# Patient Record
(demographics unavailable — no encounter records)

---

## 2024-10-10 NOTE — PHYSICAL EXAM
[Normal Conjunctiva] : normal conjunctiva [Normal Venous Pressure] : normal venous pressure [No Carotid Bruit] : no carotid bruit [Normal S1, S2] : normal S1, S2 [No Murmur] : no murmur [Soft] : abdomen soft [Normal Gait] : normal gait [Normal] : alert and oriented, normal memory

## 2024-10-10 NOTE — ASSESSMENT
[FreeTextEntry1] : pt with cad asymptomatic ldl 87 patient's rosuvastatin dose was increased from 10 mg to 20 mg with a goal of an LDL below 55 pt with knee injury pt has compartment syndrome in ankle pt on antiinflamatory and pt stress fx in knee pt had a fasciotomy  mr asymptomatic  will order a cta as patient is unable to exercise vigorously

## 2024-10-10 NOTE — REASON FOR VISIT
[FreeTextEntry1] : pt calcium score 154 hm colonscopy 2023  pt had a knee injury tchol 167 hdl 52 tg 146 ldl88 2024 pt had compartment syndrome

## 2024-11-20 NOTE — HISTORY OF PRESENT ILLNESS
[FreeTextEntry1] : 56M with HLD presenting for evaluation of positive CCTA. He states that he feels well and has no complaints. He denied cp, SOB, palpitations.  Unknown FHx as was adopted Never smoker   TTE(2023): nl EF, no valvular disease CCTA (11/5/24): , moderate pLAD, mLAD, D1. CT-FFR(11/2024): LAD 0.87 prox, 0.83 mid, 0.79 distal. D1 0.74. LCx 0.85. RCA 0.88.  long discussion on med therapy vs cath/PCI,

## 2024-11-20 NOTE — DISCUSSION/SUMMARY
[FreeTextEntry1] : 1.Cad-moderate pretest prob, positive ct/ffr, no chest pain , long discussion on medical therapy ( diet, exercise, medication counseling), pt to consider options  asa  2.chol- Crestor 20, continue, discussed Colchicine, will consider

## 2025-01-30 NOTE — DISCUSSION/SUMMARY
[FreeTextEntry1] : 1.Cad-moderate pretest prob, positive ct/ffr, no chest pain , long discussion on medical therapy ( diet, exercise, medication counseling), s/p stent to lad  asa plavix 75mg po qd Rosuvastatin 20mg po qd pt ldl 48

## 2025-01-30 NOTE — HISTORY OF PRESENT ILLNESS
[FreeTextEntry1] : 56M with HLD presenting for evaluation of positive CCTA. He states that he feels well and has no complaints. He denied cp, SOB, palpitations.pt s/p stent to lad   Unknown FHx as was adopted Never smoker   TTE(2023): nl EF, no valvular disease CCTA (11/5/24): , moderate pLAD, mLAD, D1. CT-FFR(11/2024): LAD 0.87 prox, 0.83 mid, 0.79 distal. D1 0.74. LCx 0.85. RCA 0.88.  long discussion on med therapy vs cath/PCI,

## 2025-02-26 NOTE — DISCUSSION/SUMMARY
[FreeTextEntry1] : 1.Cad-, s/p stent to lad  asa plavix 75mg po qd Rosuvastatin 20mg po qd pt ldl 48 pt had a negative ett with no EDUAR max hr 150 METS 12

## 2025-02-26 NOTE — HISTORY OF PRESENT ILLNESS
[FreeTextEntry1] : 56M with HLD presenting for evaluation of positive CCTA. He states that he feels well and has no complaints. He denied cp, SOB, palpitations.pt s/p stent to lad   Unknown FHx as was adopted Never smoker   TTE(2023): nl EF, no valvular disease CCTA (11/5/24): , moderate pLAD, mLAD, D1. CT-FFR(11/2024): LAD 0.87 prox, 0.83 mid, 0.79 distal. D1 0.74. LCx 0.85. RCA 0.88.

## 2025-03-25 NOTE — REASON FOR VISIT
[Arrhythmia/ECG Abnorrmalities] : arrhythmia/ECG abnormalities [FreeTextEntry3] : Dr. Webber and Dr. Neri Smyth

## 2025-03-25 NOTE — HISTORY OF PRESENT ILLNESS
[FreeTextEntry1] : Armani Harrison is a 57-year-old man with CAD s/p recent PCI to pLAD / balloon angioplasty referred for follow-up of atrial ectopy.   During [facility] 1/16/25 he was noted to have APCs and short runs of AT.  He was discharged with a two-week Zio patch that he mailed back (received by [facility] yesterday).    He denies palpitations / SOB  / dizziness.  He notes fleeting episodes of chest discomfort that 'feel like indigestion' and last less than 30 seconds.    He returns for follow up.  He feels well.  His prior symptoms of chest discomfort have resolved.  He denies any palpitations.  He has since started Metoprolol and tolerating this well.  No ectopy on EKG today.

## 2025-03-25 NOTE — HISTORY OF PRESENT ILLNESS
[FreeTextEntry1] : Armani Harrison is a 57-year-old man with CAD s/p recent PCI to pLAD / balloon angioplasty referred for follow-up of atrial ectopy.   During [facility] 1/16/25 he was noted to have APCs and short runs of AT.  He was discharged with a two-week Zio patch that he mailed back (received by [facility] yesterday).    He denies palpitations / SOB  / dizziness.  He notes fleeting episodes of chest discomfort that 'feel like indigestion' and last less than 30 seconds.    He returns for follow up.  He feels well.  His prior symptoms of chest discomfort have resolved.  He denies any palpitations.  He has since started Metoprolol and tolerating this well.  No ectopy on EKG today.

## 2025-03-25 NOTE — ADDENDUM
[FreeTextEntry1] : I, Francheska Houston, am scribing for and the presence of Dr. Petit the following sections: HPI, PMH,Family/social history, ROS, Physical Exam, Assessment / Plan.  I, Wayne Petit, personally performed the services described in the documentation, reviewed the documentation recorded by the scribe in my presence and it accurately and completely records my words and actions.

## 2025-03-25 NOTE — CARDIOLOGY SUMMARY
[de-identified] : 3/25/25: SB 56bpm, no ectopy 2/11/25 NSR 76bpm [de-identified] : Zio monitor 1/2025 (Just after PCI): predominant NSR Avg HR 80bpm. VT runs longest 6 beats.  SVT runs which appears to be paroxysmal AT.  longest episode 14.9seconds.  SVE 1.3%.  Symptoms responses correlated to NSR.  [de-identified] : 1/16/25 PCI pLAD, balloon diagonal

## 2025-03-25 NOTE — PHYSICAL EXAM
[Well Developed] : well developed [Well Nourished] : well nourished [No Acute Distress] : no acute distress [Normal Conjunctiva] : normal conjunctiva [Normal Venous Pressure] : normal venous pressure [No Carotid Bruit] : no carotid bruit [Normal S1, S2] : normal S1, S2 [No Murmur] : no murmur [No Rub] : no rub [Clear Lung Fields] : clear lung fields [Good Air Entry] : good air entry [No Respiratory Distress] : no respiratory distress  [Soft] : abdomen soft [Non Tender] : non-tender [No Masses/organomegaly] : no masses/organomegaly [Normal Gait] : normal gait [No Edema] : no edema [No Cyanosis] : no cyanosis [No Clubbing] : no clubbing [No Rash] : no rash [Moves all extremities] : moves all extremities [No Focal Deficits] : no focal deficits [Alert and Oriented] : alert and oriented [Normal memory] : normal memory

## 2025-03-25 NOTE — REVIEW OF SYSTEMS
[Chest Discomfort] : chest discomfort [Negative] : Heme/Lymph [SOB] : no shortness of breath [Dyspnea on exertion] : not dyspnea during exertion [Palpitations] : no palpitations [Syncope] : no syncope [FreeTextEntry5] : see HPI

## 2025-03-25 NOTE — CARDIOLOGY SUMMARY
[de-identified] : 3/25/25: SB 56bpm, no ectopy 2/11/25 NSR 76bpm [de-identified] : Zio monitor 1/2025 (Just after PCI): predominant NSR Avg HR 80bpm. VT runs longest 6 beats.  SVT runs which appears to be paroxysmal AT.  longest episode 14.9seconds.  SVE 1.3%.  Symptoms responses correlated to NSR.  [de-identified] : 1/16/25 PCI pLAD, balloon diagonal

## 2025-04-29 NOTE — DISCUSSION/SUMMARY
[FreeTextEntry1] : 1.Cad-, s/p stent to lad  asa plavix 75mg po qd Rosuvastatin 20mg po qd pt ldl 48 pt had a negative ett with no EDUAR max hr 150 METS 12 continue beta blocker for svt

## 2025-04-29 NOTE — HISTORY OF PRESENT ILLNESS
[FreeTextEntry1] : Armani Harrison is a 57-year-old man with CAD s/p recent PCI to pLAD / balloon angioplasty referred for follow-up of atrial ectopy.   During [facility] 1/16/25 he was noted to have APCs and short runs of AT.  He was discharged with a two-week Zio patch that he mailed back (received by [facility] yesterday).    He denies palpitations / SOB  / dizziness.  He notes fleeting episodes of chest discomfort that 'feel like indigestion' and last less than 30 seconds.    He returns for follow up.  He feels well.  His prior symptoms of chest discomfort have resolved.  He denies any palpitations.  He has since started Metoprolol and tolerating this well.  No ectopy on EKG today.

## 2025-04-29 NOTE — CARDIOLOGY SUMMARY
[de-identified] : 3/25/25: SB 56bpm, no ectopy 2/11/25 NSR 76bpm [de-identified] : Zio monitor 1/2025 (Just after PCI): predominant NSR Avg HR 80bpm. VT runs longest 6 beats.  SVT runs which appears to be paroxysmal AT.  longest episode 14.9seconds.  SVE 1.3%.  Symptoms responses correlated to NSR.  [de-identified] : 1/16/25 PCI pLAD, balloon diagonal

## 2025-04-29 NOTE — REVIEW OF SYSTEMS
[SOB] : no shortness of breath [Dyspnea on exertion] : not dyspnea during exertion [Chest Discomfort] : chest discomfort [Palpitations] : no palpitations [Syncope] : no syncope [Negative] : Heme/Lymph [FreeTextEntry5] : see HPI

## 2025-06-24 NOTE — REASON FOR VISIT
[FreeTextEntry1] : Patient reports intermittent chest discomfort that occurs when starting to walk, lasting a couple of minutes. The discomfort is described as dull but localized. He can point to it with one finger.  Patient had a recent viral illness but is otherwise feeling well.  He has not had recurrent palpitation and is unaware of extra beats.  The patient denies chest pain, SOB, GREEN, palpitation, light headedness, syncope or change in exertional capacity. - Past Medical History : History of atrial premature contractions Review of Systems : Recent viral illness (cold) last week, otherwise negative - Medications : - Rosuvastatin 40mg daily  - Finasteride 1mg daily  - Aspirin 81mg daily  - Clopidogrel (Plavix) daily  - Metoprolol 25mg daily  - Multivitamin daily Objective: - Diagnostic Results : EKG Normal sinus rhythm at 63 beats per minute, no atrial premature contractions noted Assessment: - Summary : The patient's chest discomfort is likely musculoskeletal in nature rather than cardiac, based on its focal nature, improvement with movement, and the ability to point to it with one finger. The EKG shows normal sinus rhythm without the previously noted atrial premature contractions. - Problems : - Intermittent chest discomfort, likely musculoskeletal  - History of atrial premature contractions, currently not present on EKG  - Differential Diagnosis : - Musculoskeletal chest pain  - Atypical angina  - Costochondritis  Plan: - Summary : The patient's condition appears stable, with a reassuring EKG. I've recommended continued monitoring and a follow-up plan. - Plan : - Continue current medication regimen  - Schedule 24-hour heart monitor in January/February for reassessment of atrial premature contractions  - Follow up with Dr. Webber as scheduled in August  - Patient to report any worsening or changes in symptoms  - Reassured patient about the likely benign nature of the chest discomfort

## 2025-06-24 NOTE — HISTORY OF PRESENT ILLNESS
[FreeTextEntry1] : Past HX:  Armani Harrison is a 57-year-old man with CAD s/p recent PCI to pLAD / balloon angioplasty referred for follow-up of atrial ectopy.   During [facility] 1/16/25 he was noted to have APCs and short runs of AT.  He was discharged with a two-week Zio patch that he mailed back (received by [facility] yesterday).    He denies palpitations / SOB  / dizziness.  He notes fleeting episodes of chest discomfort that 'feel like indigestion' and last less than 30 seconds.